# Patient Record
Sex: MALE | Race: WHITE | NOT HISPANIC OR LATINO | Employment: OTHER | URBAN - METROPOLITAN AREA
[De-identification: names, ages, dates, MRNs, and addresses within clinical notes are randomized per-mention and may not be internally consistent; named-entity substitution may affect disease eponyms.]

---

## 2021-07-01 ENCOUNTER — TELEPHONE (OUTPATIENT)
Dept: ORTHOPEDIC SURGERY | Facility: CLINIC | Age: 56
End: 2021-07-01

## 2021-07-01 ENCOUNTER — OFFICE VISIT (OUTPATIENT)
Dept: ORTHOPEDIC SURGERY | Facility: CLINIC | Age: 56
End: 2021-07-01

## 2021-07-01 VITALS — WEIGHT: 190 LBS | TEMPERATURE: 95.7 F | BODY MASS INDEX: 25.18 KG/M2 | HEIGHT: 73 IN

## 2021-07-01 DIAGNOSIS — S79.912A INJURY OF LEFT HIP, INITIAL ENCOUNTER: Primary | ICD-10-CM
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
## 2021-07-01 DIAGNOSIS — M25.552 LEFT HIP PAIN: ICD-10-CM

## 2021-07-01 PROCEDURE — 99204 OFFICE O/P NEW MOD 45 MIN: CPT | Performed by: NURSE PRACTITIONER

## 2021-07-01 PROCEDURE — 73502 X-RAY EXAM HIP UNI 2-3 VIEWS: CPT | Performed by: NURSE PRACTITIONER

## 2021-07-01 RX ORDER — IBUPROFEN 200 MG
200 TABLET ORAL EVERY 6 HOURS PRN
COMMUNITY
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
